# Patient Record
Sex: MALE | Race: BLACK OR AFRICAN AMERICAN
[De-identification: names, ages, dates, MRNs, and addresses within clinical notes are randomized per-mention and may not be internally consistent; named-entity substitution may affect disease eponyms.]

---

## 2020-07-29 ENCOUNTER — HOSPITAL ENCOUNTER (EMERGENCY)
Dept: HOSPITAL 61 - ER | Age: 40
Discharge: HOME | End: 2020-07-29
Payer: COMMERCIAL

## 2020-07-29 VITALS — DIASTOLIC BLOOD PRESSURE: 75 MMHG | SYSTOLIC BLOOD PRESSURE: 156 MMHG

## 2020-07-29 VITALS — HEIGHT: 75.5 IN | BODY MASS INDEX: 38.76 KG/M2 | WEIGHT: 315 LBS

## 2020-07-29 DIAGNOSIS — Z88.8: ICD-10-CM

## 2020-07-29 DIAGNOSIS — J18.9: ICD-10-CM

## 2020-07-29 DIAGNOSIS — E66.01: ICD-10-CM

## 2020-07-29 DIAGNOSIS — R09.81: ICD-10-CM

## 2020-07-29 DIAGNOSIS — I10: ICD-10-CM

## 2020-07-29 DIAGNOSIS — U07.1: Primary | ICD-10-CM

## 2020-07-29 DIAGNOSIS — Z88.1: ICD-10-CM

## 2020-07-29 DIAGNOSIS — Z98.890: ICD-10-CM

## 2020-07-29 DIAGNOSIS — R05: ICD-10-CM

## 2020-07-29 DIAGNOSIS — E11.9: ICD-10-CM

## 2020-07-29 DIAGNOSIS — R50.9: ICD-10-CM

## 2020-07-29 LAB
ALBUMIN SERPL-MCNC: 2.8 G/DL (ref 3.4–5)
ALBUMIN/GLOB SERPL: 0.6 {RATIO} (ref 1–1.7)
ALP SERPL-CCNC: 154 U/L (ref 46–116)
ALT SERPL-CCNC: 36 U/L (ref 16–63)
ANION GAP SERPL CALC-SCNC: 8 MMOL/L (ref 6–14)
AST SERPL-CCNC: 21 U/L (ref 15–37)
BASOPHILS # BLD AUTO: 0 X10^3/UL (ref 0–0.2)
BASOPHILS NFR BLD: 1 % (ref 0–3)
BILIRUB SERPL-MCNC: 0.5 MG/DL (ref 0.2–1)
BUN SERPL-MCNC: 23 MG/DL (ref 8–26)
BUN/CREAT SERPL: 15 (ref 6–20)
CALCIUM SERPL-MCNC: 8.3 MG/DL (ref 8.5–10.1)
CHLORIDE SERPL-SCNC: 100 MMOL/L (ref 98–107)
CO2 SERPL-SCNC: 29 MMOL/L (ref 21–32)
CREAT SERPL-MCNC: 1.5 MG/DL (ref 0.7–1.3)
EOSINOPHIL NFR BLD: 0 X10^3/UL (ref 0–0.7)
EOSINOPHIL NFR BLD: 1 % (ref 0–3)
ERYTHROCYTE [DISTWIDTH] IN BLOOD BY AUTOMATED COUNT: 14.9 % (ref 11.5–14.5)
GFR SERPLBLD BASED ON 1.73 SQ M-ARVRAT: 62.4 ML/MIN
GLOBULIN SER-MCNC: 4.5 G/DL (ref 2.2–3.8)
GLUCOSE SERPL-MCNC: 307 MG/DL (ref 70–99)
HCT VFR BLD CALC: 41.7 % (ref 39–53)
HGB BLD-MCNC: 14 G/DL (ref 13–17.5)
LYMPHOCYTES # BLD: 1.2 X10^3/UL (ref 1–4.8)
LYMPHOCYTES NFR BLD AUTO: 17 % (ref 24–48)
MCH RBC QN AUTO: 27 PG (ref 25–35)
MCHC RBC AUTO-ENTMCNC: 34 G/DL (ref 31–37)
MCV RBC AUTO: 81 FL (ref 79–100)
MONO #: 1.2 X10^3/UL (ref 0–1.1)
MONOCYTES NFR BLD: 16 % (ref 0–9)
NEUT #: 4.7 X10^3/UL (ref 1.8–7.7)
NEUTROPHILS NFR BLD AUTO: 65 % (ref 31–73)
PLATELET # BLD AUTO: 161 X10^3/UL (ref 140–400)
POTASSIUM SERPL-SCNC: 4.1 MMOL/L (ref 3.5–5.1)
PROT SERPL-MCNC: 7.3 G/DL (ref 6.4–8.2)
RBC # BLD AUTO: 5.18 X10^6/UL (ref 4.3–5.7)
SODIUM SERPL-SCNC: 137 MMOL/L (ref 136–145)
WBC # BLD AUTO: 7.2 X10^3/UL (ref 4–11)

## 2020-07-29 PROCEDURE — 85025 COMPLETE CBC W/AUTO DIFF WBC: CPT

## 2020-07-29 PROCEDURE — 87040 BLOOD CULTURE FOR BACTERIA: CPT

## 2020-07-29 PROCEDURE — 96374 THER/PROPH/DIAG INJ IV PUSH: CPT

## 2020-07-29 PROCEDURE — 36415 COLL VENOUS BLD VENIPUNCTURE: CPT

## 2020-07-29 PROCEDURE — 93005 ELECTROCARDIOGRAM TRACING: CPT

## 2020-07-29 PROCEDURE — 99285 EMERGENCY DEPT VISIT HI MDM: CPT

## 2020-07-29 PROCEDURE — 83605 ASSAY OF LACTIC ACID: CPT

## 2020-07-29 PROCEDURE — 80053 COMPREHEN METABOLIC PANEL: CPT

## 2020-07-29 PROCEDURE — 71045 X-RAY EXAM CHEST 1 VIEW: CPT

## 2020-07-29 NOTE — PHYS DOC
Past Medical History


Past Medical History:  Diabetes-Type II, Hypertension


Additional Past Medical Histor:  DIABETIC RIGHT FOOT ULCER


Past Surgical History:  Other


Additional Past Surgical Histo:  RIGHT FOOT, ankle, rotator cuff


Smoking Status:  Never Smoker


Alcohol Use:  Occasionally


Drug Use:  None





General Adult


EDM:


Chief Complaint:  FEVER





HPI:


HPI:





Patient is a 39-year-old morbidly obese diabetic who presents with a 2-day 

history of cough congestion and fever.  He states he started feeling poorly 

yesterday and it progressed throughout the day today.  He does not believe he 

has been around anybody that is had COVID.  He was at his father's  rec

ently but there only 40 or so people there and they were all wearing masks.  He 

denies hemoptysis.  He denies any significant shortness of breath.  He does 

report body aches.  He denies anosmia.]





Review of Systems:


Review of Systems:


Constitutional:   Denies fever or chills. []


Eyes:   Denies change in visual acuity. []


HENT:   Denies nasal congestion or sore throat. [] 


Respiratory:   Per HPI [] 


Cardiovascular:   Denies chest pain or edema. [] 


GI:   Denies abdominal pain, nausea, vomiting, bloody stools or diarrhea. [] 


:  Denies dysuria. [] 


Musculoskeletal:   Denies back pain or joint pain. [] 


Integument:   Denies rash. [] 


Neurologic:   Denies headache, focal weakness or sensory changes. [] 


Endocrine:   Denies polyuria or polydipsia. [] 


Lymphatic:  Denies swollen glands. [] 


Psychiatric:  Denies depression or anxiety. []





Heart Score:


Risk Factors:


Risk Factors:  DM, Current or recent (<one month) smoker, HTN, HLP, family 

history of CAD, obesity.


Risk Scores:


Score 0 - 3:  2.5% MACE over next 6 weeks - Discharge Home


Score 4 - 6:  20.3% MACE over next 6 weeks - Admit for Clinical Observation


Score 7 - 10:  72.7% MACE over next 6 weeks - Early Invasive Strategies





Current Medications:





Current Medications








 Medications


  (Trade)  Dose


 Ordered  Sig/Toni  Start Time


 Stop Time Status Last Admin


Dose Admin


 


 Acetaminophen


  (Tylenol)  1,000 mg  1X  ONCE  20 20:00


 20 20:01 DC 20 20:05


1,000 MG











Allergies:


Allergies:





Allergies








Coded Allergies Type Severity Reaction Last Updated Verified


 


  piperacillin Allergy Intermediate UNK 20 Yes


 


  tazobactam Allergy Intermediate UNK 20 Yes











Physical Exam:


PE:





Constitutional: Well developed, well nourished, no acute distress, appears 

acutely ill]


HENT: Normocephalic, atraumatic, bilateral external ears normal, oropharynx 

moist, no oral exudates, nose normal. []


Eyes: PERRLA, EOMI, conjunctiva normal, no discharge. [] 


Neck: Normal range of motion, no tenderness, supple, no stridor. [] 


Cardiovascular: Tachycardic no murmur []


Lungs & Thorax:  Bilateral breath sounds clear to auscultation []


Abdomen: Morbidly obese, bowel sounds normal, soft, no tenderness, no masses, no

 pulsatile masses. [] 


Skin: Warm, dry, no erythema, no rash. [] 


Back: No tenderness, no CVA tenderness. [] 


Extremities: No tenderness, no cyanosis, no clubbing, ROM intact, no edema. [] 


Neurologic: Alert and oriented X 3, normal motor function, normal sensory 

function, no focal deficits noted. []


Psychologic: Affect normal, judgement normal, mood normal. []





Current Patient Data:


Labs:





                                Laboratory Tests








Test


 20


19:55


 


White Blood Count


 7.2 x10^3/uL


(4.0-11.0)


 


Red Blood Count


 5.18 x10^6/uL


(4.30-5.70)


 


Hemoglobin


 14.0 g/dL


(13.0-17.5)


 


Hematocrit


 41.7 %


(39.0-53.0)


 


Mean Corpuscular Volume


 81 fL ()





 


Mean Corpuscular Hemoglobin 27 pg (25-35)  


 


Mean Corpuscular Hemoglobin


Concent 34 g/dL


(31-37)


 


Red Cell Distribution Width


 14.9 %


(11.5-14.5)  H


 


Platelet Count


 161 x10^3/uL


(140-400)


 


Neutrophils (%) (Auto) 65 % (31-73)  


 


Lymphocytes (%) (Auto) 17 % (24-48)  L


 


Monocytes (%) (Auto) 16 % (0-9)  H


 


Eosinophils (%) (Auto) 1 % (0-3)  


 


Basophils (%) (Auto) 1 % (0-3)  


 


Neutrophils # (Auto)


 4.7 x10^3/uL


(1.8-7.7)


 


Lymphocytes # (Auto)


 1.2 x10^3/uL


(1.0-4.8)


 


Monocytes # (Auto)


 1.2 x10^3/uL


(0.0-1.1)  H


 


Eosinophils # (Auto)


 0.0 x10^3/uL


(0.0-0.7)


 


Basophils # (Auto)


 0.0 x10^3/uL


(0.0-0.2)


 


Sodium Level


 137 mmol/L


(136-145)


 


Potassium Level


 4.1 mmol/L


(3.5-5.1)


 


Chloride Level


 100 mmol/L


()


 


Carbon Dioxide Level


 29 mmol/L


(21-32)


 


Anion Gap 8 (6-14)  


 


Blood Urea Nitrogen


 23 mg/dL


(8-26)


 


Creatinine


 1.5 mg/dL


(0.7-1.3)  H


 


Estimated GFR


(Cockcroft-Gault) 62.4  





 


BUN/Creatinine Ratio 15 (6-20)  


 


Glucose Level


 307 mg/dL


(70-99)  H


 


Lactic Acid Level


 2.8 mmol/L


(0.4-2.0)  H


 


Calcium Level


 8.3 mg/dL


(8.5-10.1)  L


 


Total Bilirubin


 0.5 mg/dL


(0.2-1.0)


 


Aspartate Amino Transferase


(AST) 21 U/L (15-37)





 


Alanine Aminotransferase (ALT)


 36 U/L (16-63)





 


Alkaline Phosphatase


 154 U/L


()  H


 


Total Protein


 7.3 g/dL


(6.4-8.2)


 


Albumin


 2.8 g/dL


(3.4-5.0)  L


 


Albumin/Globulin Ratio


 0.6 (1.0-1.7)


L





                                Laboratory Tests


20 19:55








                                Laboratory Tests


20 19:55








Vital Signs:





                                   Vital Signs








  Date Time  Temp Pulse Resp B/P (MAP) Pulse Ox O2 Delivery O2 Flow Rate FiO2


 


20 20:25  116 20 166/82 (110) 98 Room Air  


 


20 19:10 102.5       





 102.5       











EKG:


EKG:


[EKG: Sinus tachycardia rate of 120 without ischemic ST-T changes]





Radiology/Procedures:


Radiology/Procedures:


[]


Impression:


REASON: cough


PROCEDURE: CHEST AP ONLY





EXAM: AP View of the chest


 


DATE: 2020 7:56 PM


 


INDICATION: Cough


 


COMPARISON: 3/15/2020, 3/19/2018


 


FINDINGS/


IMPRESSION:


1.  Cardiomediastinal silhouette is stable. 


2.  Left parahilar and bilateral lung base airspace opacities represent 


atelectasis or developing consolidation. Imaging follow-up to resolution 


is recommended.


3.  No pleural effusion or pneumothorax.





Course & Med Decision Making:


Course & Med Decision Making


Pertinent Labs and Imaging studies reviewed. (See chart for details)





[ED course: Evaluation reveals a 39-year-old male with likely COVID-19.  He was 

febrile to 102 in the department.  His chest x-ray appears consistent with 

COVID-19.  He was given IV fluids Tylenol and Toradol which did reduce his fever

 and his heart rate came down appropriately.  His oxygen saturation remained in 

the upper 90s.]





Dragon Disclaimer:


Dragon Disclaimer:


This electronic medical record was generated, in whole or in part, using a voice

recognition dictation system.





Departure


Departure


Impression:  


   Primary Impression:  


   Pneumonia due to COVID-19 virus


Disposition:   HOME, SELF-CARE


Condition:  STABLE


Referrals:  


UNKNOWN PCP NAME (PCP)


Patient Instructions:  Viral Syndrome





Additional Instructions:  


Thank you for visiting [facility]. We appreciate you trusting us 

with your care. If any additional problems come up don't hesitate to return to 

visit us. Please follow up with your primary care provider so they can plan 

additional care if needed and know about the problem that you had. If symptoms 

worsen come back to the Emergency Department. Any concerning symptoms that start

such as chest pain, shortness of air, weakness or numbness on one side of the 

body, running high fevers or any other concerning symptoms return to the ER.


You have a viral syndrome which may include symptoms like muscle aches, fevers, 

chills, runny nose, cough, sneezing, sore throat, vomiting, or diarrhea. One of 

the potential viruses that you may have is SARS-CoV-2, the virus that causes 

COVID-19, also known as the Coronavirus. You are just as likely to have a 

different viral infection such as the common cold, flu, etc. Most patients with 

the Coronavirus have mild symptoms and recover on their own. Resting, staying 

hydrated, and sleep from known cases can be helpful. As of todays visit, you 

are well enough to go home and treat your symptoms with oral fluids and over the

counter medications. 


Coronavirus testing is not performed on most people with mild symptoms who are 

being discharged from the emergency department.


If Coronavirus testing was performed the results will not be available for 

possibly up to 2-3 days. If your result is positive you will be contacted. 


Please follow the following precautions at home:


1)   Stay home except to get medical care.


2)   As advised by the CDC we recommend you stay in your home and minimize 

contact with other people. We do not want you to spread the infection. 


3)   Those who are older or have significant medical issues may have more severe

symptoms from this infection. We recommend self-isolation,FOR AT LEAST 7 DAYS a

fter your 1st day of symptoms. AFTER you feel better please wait AT LEAST 

ANOTHER WEEK before returning to regular activities and being around other 

people!


4)   IF you become sicker and have difficulty breathing, chest pain, unable to 

eat/drink, severe vomiting, diarrhea, or weakness you may need to return to the 

Emergency Department.


5)   You should restrict activities outside your home, except for getting 

medical care. DO NOT go to work, school, or public areas. Avoid using public 

transportation, ride sharing, or taxis.


6)   Separate yourself from other people in your home. You should use a separate

bathroom if possible.


7)   Avoid sharing personal household items such as dishes, cups, eating laura

nsils, towels, etc.


8)   Clean all high touch surfaces every day (door knobs, counter tops, etc). 

Use a household cleaning spray or wipe per label instructions.


9)   Clean your hands often. Wash your hands with soap and water for at least 20

seconds. 


10)   Cover your mouth and nose with a tissue when you cough or sneeze.


11)   Throw used tissues in a trash can and immediately wash your hands. 


For additional resources please visit the CDC website or the [facility] 

of [facility] ([phone]).





Justicifation of Admission Dx:


Justifications for Admission:


Justification of Admission Dx:  No











VIKY JORGE DO             2020 20:45

## 2020-07-30 NOTE — EKG
[facility]

              [address] [place] [address]

Test Date:    2020               Test Time:    19:49:14

Pat Name:     CATALINO LANGE               Department:   

Patient ID:   [id number]           Room:          

Gender:       M                        Technician:   

:          [id number]               Requested By: VIKY JORGE

Order Number: [id number]           Reading MD:     

                                 Measurements

Intervals                              Axis          

Rate:         119                      P:            56

SC:           156                      QRS:          -19

QRSD:         84                       T:            42

QT:           300                                    

QTc:          423                                    

                           Interpretive Statements

SINUS TACHYCARDIA

LEFTWARD AXIS

QRS(T) CONTOUR ABNORMALITY

CONSIDER ANTEROSEPTAL MYOCARDIAL DAMAGE

CONSIDER INFERIOR INFARCT

POSSIBLY ABNORMAL ECG

RI6.01

No previous ECG available for comparison

## 2025-07-08 NOTE — RAD
EXAM: AP View of the chest

 

DATE: 7/29/2020 7:56 PM

 

INDICATION: Cough

 

COMPARISON: 3/15/2020, 3/19/2018

 

FINDINGS/

IMPRESSION:

1.  Cardiomediastinal silhouette is stable. 

2.  Left parahilar and bilateral lung base airspace opacities represent 

atelectasis or developing consolidation. Imaging follow-up to resolution 

is recommended.

3.  No pleural effusion or pneumothorax.

 

Electronically signed by: Carson Durant MD (7/29/2020 8:28 PM) CAROL
no